# Patient Record
Sex: MALE | Race: BLACK OR AFRICAN AMERICAN | NOT HISPANIC OR LATINO
[De-identification: names, ages, dates, MRNs, and addresses within clinical notes are randomized per-mention and may not be internally consistent; named-entity substitution may affect disease eponyms.]

---

## 2019-06-17 ENCOUNTER — TRANSCRIPTION ENCOUNTER (OUTPATIENT)
Age: 29
End: 2019-06-17

## 2019-06-18 ENCOUNTER — INPATIENT (INPATIENT)
Facility: HOSPITAL | Age: 29
LOS: 1 days | Discharge: HOME | End: 2019-06-20
Attending: PLASTIC SURGERY | Admitting: PLASTIC SURGERY
Payer: MEDICAID

## 2019-06-18 ENCOUNTER — TRANSCRIPTION ENCOUNTER (OUTPATIENT)
Age: 29
End: 2019-06-18

## 2019-06-18 VITALS
RESPIRATION RATE: 20 BRPM | TEMPERATURE: 98 F | OXYGEN SATURATION: 100 % | HEART RATE: 82 BPM | SYSTOLIC BLOOD PRESSURE: 131 MMHG | DIASTOLIC BLOOD PRESSURE: 52 MMHG
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
## 2019-06-18 PROCEDURE — 73630 X-RAY EXAM OF FOOT: CPT | Mod: 26,LT

## 2019-06-18 PROCEDURE — 73130 X-RAY EXAM OF HAND: CPT | Mod: 26,LT

## 2019-06-18 RX ORDER — HUMAN RABIES VIRUS IMMUNE GLOBULIN 150 [IU]/ML
1452 INJECTION, SOLUTION INTRAMUSCULAR ONCE
Refills: 0 | Status: COMPLETED | OUTPATIENT
Start: 2019-06-18 | End: 2019-06-18

## 2019-06-18 RX ORDER — AMPICILLIN SODIUM AND SULBACTAM SODIUM 250; 125 MG/ML; MG/ML
3 INJECTION, POWDER, FOR SUSPENSION INTRAMUSCULAR; INTRAVENOUS ONCE
Refills: 0 | Status: DISCONTINUED | OUTPATIENT
Start: 2019-06-18 | End: 2019-06-18

## 2019-06-18 RX ORDER — TETANUS TOXOID, REDUCED DIPHTHERIA TOXOID AND ACELLULAR PERTUSSIS VACCINE, ADSORBED 5; 2.5; 8; 8; 2.5 [IU]/.5ML; [IU]/.5ML; UG/.5ML; UG/.5ML; UG/.5ML
0.5 SUSPENSION INTRAMUSCULAR ONCE
Refills: 0 | Status: COMPLETED | OUTPATIENT
Start: 2019-06-18 | End: 2019-06-18

## 2019-06-18 RX ORDER — RABIES VACC, HUMAN DIPLOID/PF 2.5 UNIT
1 VIAL (EA) INTRAMUSCULAR ONCE
Refills: 0 | Status: COMPLETED | OUTPATIENT
Start: 2019-06-18 | End: 2019-06-18

## 2019-06-18 RX ORDER — AMPICILLIN SODIUM AND SULBACTAM SODIUM 250; 125 MG/ML; MG/ML
3 INJECTION, POWDER, FOR SUSPENSION INTRAMUSCULAR; INTRAVENOUS
Refills: 0 | Status: COMPLETED | OUTPATIENT
Start: 2019-06-19 | End: 2019-06-19

## 2019-06-18 RX ORDER — RABIES VACC, HUMAN DIPLOID/PF 2.5 UNIT
1 VIAL (EA) INTRAMUSCULAR ONCE
Refills: 0 | Status: DISCONTINUED | OUTPATIENT
Start: 2019-06-18 | End: 2019-06-18

## 2019-06-18 RX ADMIN — Medication 1 TABLET(S): at 21:45

## 2019-06-18 RX ADMIN — TETANUS TOXOID, REDUCED DIPHTHERIA TOXOID AND ACELLULAR PERTUSSIS VACCINE, ADSORBED 0.5 MILLILITER(S): 5; 2.5; 8; 8; 2.5 SUSPENSION INTRAMUSCULAR at 21:43

## 2019-06-18 RX ADMIN — HUMAN RABIES VIRUS IMMUNE GLOBULIN 1452 UNIT(S): 150 INJECTION, SOLUTION INTRAMUSCULAR at 23:56

## 2019-06-18 RX ADMIN — Medication 1 MILLILITER(S): at 23:15

## 2019-06-18 NOTE — ED PROVIDER NOTE - CLINICAL SUMMARY MEDICAL DECISION MAKING FREE TEXT BOX
Pt is a 27 y/o M marijuana smoker PMHx hernia repair p/w dog bites 5 hours ago. -- dog bites w/ unknown vaccination status; possible tendon injury of left hand, r/o fracture -- xray left hand, xray left foot, adacel, rabies vaccines, rabies immune globulin

## 2019-06-18 NOTE — ED PROVIDER NOTE - OBJECTIVE STATEMENT
Pt is a 27 y/o M marijuana smoker PMHx hernia repair p/w dog bites 5 hours ago.  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  Pt notes he has inability to flex at IP joint of left hand 1st digit.  Pt notes mild bleeding, which has resolved.  Pt's tetanus shot not up to date.  Pt states he's not sure if dog's vaccines are up to date and states not sure if dog can be observed.  Pt denies any numbness, lightheadedness or any other injuries.

## 2019-06-18 NOTE — ED PROVIDER NOTE - PHYSICAL EXAMINATION
Wounds:  Puncture wound:  Left hand 0.8 cm dorsal aspect of wrist; 0.8 cm thenar eminence; 1st digit:  dorsal aspect 0.5 cm; overlying IP joint 2 cm.  Left foot:  Between 2nd and 3rd digit 4 cm, jagged.  Right thigh:  Medial aspect 3 cm. Wounds:  Puncture wound:  Left hand 0.8 cm dorsal aspect of wrist; 0.8 cm thenar eminence; 1st digit:  dorsal aspect 0.5 cm; overlying palmar aspect IP joint 2 cm.  Left foot:  Between 2nd and 3rd digit 4 cm, jagged.  Right thigh:  abrasion Medial aspect 3 cm.

## 2019-06-18 NOTE — ED PROVIDER NOTE - ATTENDING CONTRIBUTION TO CARE
Gong: I have seen and examined the patient face to face, have reviewed and addended the HPI, PE and a/p as necessary.     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  Pt sustained bites to L hand and L foot and R thigh.  Noted to be unable to bend L thumb at the IP joint.  Unsure if dog was vaccinated.  Denies numbness, lightheadedness, chest pain, or shortness of breath.     GEN - NAD; well appearing; A+O x3; non-toxic appearing   CARD -s1s2, RRR, no M,G,R;   PULM - CTA b/l, symmetric breath sounds;   ABD:  +BS, ND, NT, soft, no guarding, no rebound, no masses;   BACK: no CVA tenderness, Normal  spine;   EXT: symmetric pulses, 2+ dp, capillary refill < 2 seconds, no clubbing, no cyanosis, no edema  SKIN: Wounds:  Puncture wound:  Left hand 0.8 cm dorsal aspect of wrist; 0.8 cm thenar eminence; 1st digit:  dorsal aspect 0.5 cm; overlying palmar aspect IP joint 2 cm.  Left foot:  Between 2nd and 3rd digit 4 cm, jagged.  Right thigh:  abrasion Medial aspect 3 cm.    29 yo M a/w dog bite, unclear of vaccination of dog.  Concern for tendon injury on L hand, will consult hand.  On Foot noted to have laceration, will consult podiatry.  Local wound care to thigh.  Tetanus up to date. Rabies vaccine and Rabies immunoglobulin to wound  xrays, augmentin, may require admission for surgical repair of hand.

## 2019-06-18 NOTE — ED PROVIDER NOTE - PROGRESS NOTE DETAILS
STANLEY BOB:  Pt seen by Dr. Hanna who recommends admission at this time.  Podiatry to see patient.  Dr. Hanna recommends Unasyn and NPO after midnight. STANLEY BOB:  Pt seen by Dr. Hanna who recommends admission at this time.  Podiatry to see patient.  Dr. Hanna recommends Unasyn and NPO after midnight.  Rabies immune globulin injected into bite sites.

## 2019-06-18 NOTE — ED PROVIDER NOTE - UPPER EXTREMITY EXAM, LEFT
unable to flex at interphalyngeal joint of 1st digit; weakness with adduction and abduction, < 2 sec capillary refill; sensation intact to light touch

## 2019-06-18 NOTE — ED ADULT TRIAGE NOTE - CHIEF COMPLAINT QUOTE
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

## 2019-06-19 ENCOUNTER — TRANSCRIPTION ENCOUNTER (OUTPATIENT)
Age: 29
End: 2019-06-19

## 2019-06-19 VITALS
OXYGEN SATURATION: 100 % | TEMPERATURE: 98 F | RESPIRATION RATE: 17 BRPM | DIASTOLIC BLOOD PRESSURE: 81 MMHG | HEART RATE: 100 BPM | SYSTOLIC BLOOD PRESSURE: 144 MMHG
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
## 2019-06-19 LAB
ALBUMIN SERPL ELPH-MCNC: 5 G/DL — SIGNIFICANT CHANGE UP (ref 3.3–5)
ALP SERPL-CCNC: 38 U/L — LOW (ref 40–120)
ALT FLD-CCNC: 19 U/L — SIGNIFICANT CHANGE UP (ref 4–41)
ANION GAP SERPL CALC-SCNC: 18 MMO/L — HIGH (ref 7–14)
APTT BLD: 27.9 SEC — SIGNIFICANT CHANGE UP (ref 27.5–36.3)
AST SERPL-CCNC: 33 U/L — SIGNIFICANT CHANGE UP (ref 4–40)
BASOPHILS # BLD AUTO: 0.03 K/UL — SIGNIFICANT CHANGE UP (ref 0–0.2)
BASOPHILS NFR BLD AUTO: 0.2 % — SIGNIFICANT CHANGE UP (ref 0–2)
BILIRUB SERPL-MCNC: 0.8 MG/DL — SIGNIFICANT CHANGE UP (ref 0.2–1.2)
BLD GP AB SCN SERPL QL: NEGATIVE — SIGNIFICANT CHANGE UP
BUN SERPL-MCNC: 14 MG/DL — SIGNIFICANT CHANGE UP (ref 7–23)
CALCIUM SERPL-MCNC: 9.9 MG/DL — SIGNIFICANT CHANGE UP (ref 8.4–10.5)
CHLORIDE SERPL-SCNC: 102 MMOL/L — SIGNIFICANT CHANGE UP (ref 98–107)
CO2 SERPL-SCNC: 22 MMOL/L — SIGNIFICANT CHANGE UP (ref 22–31)
CREAT SERPL-MCNC: 1.53 MG/DL — HIGH (ref 0.5–1.3)
EOSINOPHIL # BLD AUTO: 0 K/UL — SIGNIFICANT CHANGE UP (ref 0–0.5)
EOSINOPHIL NFR BLD AUTO: 0 % — SIGNIFICANT CHANGE UP (ref 0–6)
GLUCOSE SERPL-MCNC: 94 MG/DL — SIGNIFICANT CHANGE UP (ref 70–99)
HCT VFR BLD CALC: 45.6 % — SIGNIFICANT CHANGE UP (ref 39–50)
HGB BLD-MCNC: 15.7 G/DL — SIGNIFICANT CHANGE UP (ref 13–17)
IMM GRANULOCYTES NFR BLD AUTO: 0.5 % — SIGNIFICANT CHANGE UP (ref 0–1.5)
INR BLD: 1.05 — SIGNIFICANT CHANGE UP (ref 0.88–1.17)
LYMPHOCYTES # BLD AUTO: 0.36 K/UL — LOW (ref 1–3.3)
LYMPHOCYTES # BLD AUTO: 2.2 % — LOW (ref 13–44)
MCHC RBC-ENTMCNC: 29.5 PG — SIGNIFICANT CHANGE UP (ref 27–34)
MCHC RBC-ENTMCNC: 34.4 % — SIGNIFICANT CHANGE UP (ref 32–36)
MCV RBC AUTO: 85.6 FL — SIGNIFICANT CHANGE UP (ref 80–100)
MONOCYTES # BLD AUTO: 0.91 K/UL — HIGH (ref 0–0.9)
MONOCYTES NFR BLD AUTO: 5.5 % — SIGNIFICANT CHANGE UP (ref 2–14)
NEUTROPHILS # BLD AUTO: 15.17 K/UL — HIGH (ref 1.8–7.4)
NEUTROPHILS NFR BLD AUTO: 91.6 % — HIGH (ref 43–77)
NRBC # FLD: 0 K/UL — SIGNIFICANT CHANGE UP (ref 0–0)
PLATELET # BLD AUTO: 196 K/UL — SIGNIFICANT CHANGE UP (ref 150–400)
PMV BLD: 9.4 FL — SIGNIFICANT CHANGE UP (ref 7–13)
POTASSIUM SERPL-MCNC: 3.2 MMOL/L — LOW (ref 3.5–5.3)
POTASSIUM SERPL-SCNC: 3.2 MMOL/L — LOW (ref 3.5–5.3)
PROT SERPL-MCNC: 7.7 G/DL — SIGNIFICANT CHANGE UP (ref 6–8.3)
PROTHROM AB SERPL-ACNC: 12 SEC — SIGNIFICANT CHANGE UP (ref 9.8–13.1)
RBC # BLD: 5.33 M/UL — SIGNIFICANT CHANGE UP (ref 4.2–5.8)
RBC # FLD: 11.9 % — SIGNIFICANT CHANGE UP (ref 10.3–14.5)
RH IG SCN BLD-IMP: POSITIVE — SIGNIFICANT CHANGE UP
SODIUM SERPL-SCNC: 142 MMOL/L — SIGNIFICANT CHANGE UP (ref 135–145)
WBC # BLD: 16.55 K/UL — HIGH (ref 3.8–10.5)
WBC # FLD AUTO: 16.55 K/UL — HIGH (ref 3.8–10.5)

## 2019-06-19 PROCEDURE — 99254 IP/OBS CNSLTJ NEW/EST MOD 60: CPT

## 2019-06-19 PROCEDURE — 99223 1ST HOSP IP/OBS HIGH 75: CPT

## 2019-06-19 PROCEDURE — 93010 ELECTROCARDIOGRAM REPORT: CPT

## 2019-06-19 RX ORDER — AMPICILLIN SODIUM AND SULBACTAM SODIUM 250; 125 MG/ML; MG/ML
INJECTION, POWDER, FOR SUSPENSION INTRAMUSCULAR; INTRAVENOUS
Refills: 0 | Status: DISCONTINUED | OUTPATIENT
Start: 2019-06-19 | End: 2019-06-20

## 2019-06-19 RX ORDER — POVIDONE-IODINE 5 %
1 AEROSOL (ML) TOPICAL ONCE
Refills: 0 | Status: COMPLETED | OUTPATIENT
Start: 2019-06-19 | End: 2019-06-19

## 2019-06-19 RX ORDER — AMPICILLIN SODIUM AND SULBACTAM SODIUM 250; 125 MG/ML; MG/ML
3 INJECTION, POWDER, FOR SUSPENSION INTRAMUSCULAR; INTRAVENOUS ONCE
Refills: 0 | Status: COMPLETED | OUTPATIENT
Start: 2019-06-19 | End: 2019-06-19

## 2019-06-19 RX ORDER — OXYCODONE HYDROCHLORIDE 5 MG/1
5 TABLET ORAL EVERY 4 HOURS
Refills: 0 | Status: DISCONTINUED | OUTPATIENT
Start: 2019-06-19 | End: 2019-06-20

## 2019-06-19 RX ORDER — SODIUM CHLORIDE 9 MG/ML
1000 INJECTION, SOLUTION INTRAVENOUS
Refills: 0 | Status: DISCONTINUED | OUTPATIENT
Start: 2019-06-19 | End: 2019-06-19

## 2019-06-19 RX ORDER — SODIUM CHLORIDE 9 MG/ML
1000 INJECTION, SOLUTION INTRAVENOUS
Refills: 0 | Status: DISCONTINUED | OUTPATIENT
Start: 2019-06-19 | End: 2019-06-20

## 2019-06-19 RX ORDER — AMPICILLIN SODIUM AND SULBACTAM SODIUM 250; 125 MG/ML; MG/ML
3 INJECTION, POWDER, FOR SUSPENSION INTRAMUSCULAR; INTRAVENOUS EVERY 6 HOURS
Refills: 0 | Status: DISCONTINUED | OUTPATIENT
Start: 2019-06-20 | End: 2019-06-20

## 2019-06-19 RX ORDER — ACETAMINOPHEN 500 MG
650 TABLET ORAL EVERY 6 HOURS
Refills: 0 | Status: DISCONTINUED | OUTPATIENT
Start: 2019-06-19 | End: 2019-06-20

## 2019-06-19 RX ORDER — CHLORHEXIDINE GLUCONATE 213 G/1000ML
1 SOLUTION TOPICAL EVERY 12 HOURS
Refills: 0 | Status: DISCONTINUED | OUTPATIENT
Start: 2019-06-19 | End: 2019-06-20

## 2019-06-19 RX ORDER — OXYCODONE HYDROCHLORIDE 5 MG/1
10 TABLET ORAL EVERY 4 HOURS
Refills: 0 | Status: DISCONTINUED | OUTPATIENT
Start: 2019-06-19 | End: 2019-06-20

## 2019-06-19 RX ORDER — ONDANSETRON 8 MG/1
4 TABLET, FILM COATED ORAL EVERY 8 HOURS
Refills: 0 | Status: DISCONTINUED | OUTPATIENT
Start: 2019-06-19 | End: 2019-06-20

## 2019-06-19 RX ORDER — IBUPROFEN 200 MG
1 TABLET ORAL
Qty: 30 | Refills: 0
Start: 2019-06-19

## 2019-06-19 RX ADMIN — CHLORHEXIDINE GLUCONATE 1 APPLICATION(S): 213 SOLUTION TOPICAL at 10:00

## 2019-06-19 RX ADMIN — ONDANSETRON 4 MILLIGRAM(S): 8 TABLET, FILM COATED ORAL at 04:16

## 2019-06-19 RX ADMIN — Medication 1 APPLICATION(S): at 19:58

## 2019-06-19 RX ADMIN — AMPICILLIN SODIUM AND SULBACTAM SODIUM 200 GRAM(S): 250; 125 INJECTION, POWDER, FOR SUSPENSION INTRAMUSCULAR; INTRAVENOUS at 19:01

## 2019-06-19 RX ADMIN — SODIUM CHLORIDE 75 MILLILITER(S): 9 INJECTION, SOLUTION INTRAVENOUS at 23:25

## 2019-06-19 RX ADMIN — SODIUM CHLORIDE 125 MILLILITER(S): 9 INJECTION, SOLUTION INTRAVENOUS at 03:52

## 2019-06-19 RX ADMIN — AMPICILLIN SODIUM AND SULBACTAM SODIUM 200 GRAM(S): 250; 125 INJECTION, POWDER, FOR SUSPENSION INTRAMUSCULAR; INTRAVENOUS at 06:56

## 2019-06-19 NOTE — DISCHARGE NOTE PROVIDER - CARE PROVIDER_API CALL
Anup Hanna (DO)  Plastic Surgery  [address] [place] [address]  Phone: [phone]  Fax: [phone]  Follow Up Time:

## 2019-06-19 NOTE — DISCHARGE NOTE NURSING/CASE MANAGEMENT/SOCIAL WORK - NSDCFUADDAPPT_GEN_ALL_CORE_FT
Please follow-up with Dr. Hanna on Friday. Call on Thursday for an appointment. You will have to continue Rabies vaccines as well.

## 2019-06-19 NOTE — H&P ADULT - NSHPLABSRESULTS_GEN_ALL_CORE
15.7   16.55 )-----------( 196      ( 19 Jun 2019 00:40 )             45.6     19 Jun 2019 00:40    142    |  102    |  14     ----------------------------<  94     3.2     |  22     |  1.53     Ca    9.9        19 Jun 2019 00:40    TPro  7.7    /  Alb  5.0    /  TBili  0.8    /  DBili  x      /  AST  33     /  ALT  19     /  AlkPhos  38     19 Jun 2019 00:40    LIVER FUNCTIONS - ( 19 Jun 2019 00:40 )  Alb: 5.0 g/dL / Pro: 7.7 g/dL / ALK PHOS: 38 u/L / ALT: 19 u/L / AST: 33 u/L / GGT: x           PT/INR - ( 19 Jun 2019 00:40 )   PT: 12.0 SEC;   INR: 1.05          PTT - ( 19 Jun 2019 00:40 )  PTT:27.9 SEC  CAPILLARY BLOOD GLUCOSE            < from: Xray Hand 3 Views, Left (06.18.19 @ 23:14) >    EXAM:  RAD HAND 3 VIEW LT        PROCEDURE DATE:  Jun 18 2019         INTERPRETATION:  CLINICAL INFORMATION: Dog bite. Difficulty abducting and   abducting first digit.    TECHNIQUE: 3 views of the left hand.    COMPARISON: None available.    IMPRESSION:     No acute fracture or dislocation. No radiopaque foreign body. Soft tissue   swelling is noted around the first IP joint. Joint spaces are preserved.      < end of copied text >

## 2019-06-19 NOTE — DISCHARGE NOTE PROVIDER - NSDCFUADDAPPT_GEN_ALL_CORE_FT
Please follow-up with Dr. Hanna in one week. Please follow-up with Dr. Hanna on Friday. Call on Thursday for an appointment. You will have to continue Rabies vaccines as well.

## 2019-06-19 NOTE — CONSULT NOTE ADULT - SUBJECTIVE AND OBJECTIVE BOX
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  Bitten on left hand, left foot, right thigh.  In ER received rabies immune globulin injected in wounds and rabies vaccine x 1 and Adacel x 1.  Awaiting OR.     PAST MEDICAL & SURGICAL HISTORY:  No pertinent past medical history  Hernia repair as child    Allergies    No Known Allergies    Intolerances        ANTIMICROBIALS:  amoxicillin  875 milliGRAM(s)/clavulanate 1 two times a day      OTHER MEDS:  acetaminophen   Tablet .. 650 milliGRAM(s) Oral every 6 hours PRN  chlorhexidine 2% Cloths 1 Application(s) Topical every 12 hours  lactated ringers. 1000 milliLiter(s) IV Continuous <Continuous>  ondansetron Injectable 4 milliGRAM(s) IV Push every 8 hours PRN  oxyCODONE    IR 5 milliGRAM(s) Oral every 4 hours PRN  oxyCODONE    IR 10 milliGRAM(s) Oral every 4 hours PRN  povidone iodine 5% Nasal Swab 1 Application(s) Both Nostrils once      SOCIAL HISTORY: lives home    FAMILY HISTORY: non contrib      REVIEW OF SYSTEMS  [  ] ROS unobtainable because:    [ x ] All other systems negative except as noted below:	    Constitutional:  [ ] fever [ ] chills  [ ] weight loss  [ ] weakness  Skin:  [ ] rash [ ] phlebitis	  Eyes: [ ] icterus [ ] pain  [ ] discharge	  ENMT: [ ] sore throat  [ ] thrush [ ] ulcers [ ] exudates  Respiratory: [ ] dyspnea [ ] hemoptysis [ ] cough [ ] sputum	  Cardiovascular:  [ ] chest pain [ ] palpitations [ ] edema	  Gastrointestinal:  [ ] nausea [ ] vomiting [ ] diarrhea [ ] constipation [ ] pain	  Genitourinary:  [ ] dysuria [ ] frequency [ ] hematuria [ ] discharge [ ] flank pain  [ ] incontinence  Musculoskeletal:  [ ] myalgias [ ] arthralgias [ ] arthritis  [ ] back pain pain hand, foot, thigh  Neurological:  [ ] headache [ ] seizures  [ ] confusion/altered mental status  Psychiatric:  [ ] anxiety [ ] depression	  Hematology/Lymphatics:  [ ] lymphadenopathy  Endocrine:  [ ] adrenal [ ] thyroid  Allergic/Immunologic:	 [ ] transplant [ ] seasonal    PHYSICAL EXAM:  General:  non-toxic  HEAD/EYES:  white sclera  ENT:   supple  Cardiovascular:  S1S2  Respiratory:   clear to ausculation bilaterally  GI:   soft, non-tender, normal bowel sounds  :  no morton   Musculoskeletal: dressing left hand, left foot, right thigh   Skin:   no rash  Psychiatric:   appropriate affect  alert & oriented  Lines:  peripheral iv right arm          Drug Dosing Weight  Height (cm): 175.26 (19 Jun 2019 07:54)  Weight (kg): 67.6 (19 Jun 2019 07:54)  BMI (kg/m2): 22 (19 Jun 2019 07:54)  BSA (m2): 1.82 (19 Jun 2019 07:54)    Vital Signs Last 24 Hrs  T(F): 98 (06-19-19 @ 09:20), Max: 98.8 (06-19-19 @ 01:00)    Vital Signs Last 24 Hrs  HR: 62 (06-19-19 @ 09:20) (60 - 82)  BP: 132/71 (06-19-19 @ 09:20) (128/57 - 132/71)  RR: 18 (06-19-19 @ 09:20)  SpO2: 100% (06-19-19 @ 09:20) (100% - 100%)  Wt(kg): --                          15.7   16.55 )-----------( 196      ( 19 Jun 2019 00:40 )             45.6       06-19    142  |  102  |  14  ----------------------------<  94  3.2<L>   |  22  |  1.53<H>    Ca    9.9      19 Jun 2019 00:40    TPro  7.7  /  Alb  5.0  /  TBili  0.8  /  DBili  x   /  AST  33  /  ALT  19  /  AlkPhos  38<L>  06-19          MICROBIOLOGY:        RADIOLOGY:    < from: Xray Hand 3 Views, Left (06.18.19 @ 23:14) >  No acute fracture or dislocation. No radiopaque foreign body. Soft tissue   swelling is noted around the first IP joint. Joint spaces are preserved.      < end of copied text >  < from: Xray Foot AP + Lateral + Oblique, Left (06.18.19 @ 23:12) >  There is no acute fracture or dislocation. The soft tissue laceration is   not identified radiographically. No radiopaque foreign body. No   subcutaneous emphysema. Joint spaces are preserved.     < end of copied text >
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  Dog bites on L foot, right thigh and L hand - L hand is the most extensive.  Pt not aware of vaccination status of dog.  Pt denies medical history, does not take routine medications daily.  he does occasionally smoke marijuana.  He is able to perform all of his own ADLs.  Denies fevers/chills/n/v.  Pain in L hand - decreased ROM of fingers.  he has no loss of sensation in hand.        PAST MEDICAL & SURGICAL HISTORY:  No pertinent past medical history      Review of Systems:   CONSTITUTIONAL: No fever, weight loss, or fatigue  EYES: No eye pain, visual disturbances, or discharge  ENMT:  No difficulty hearing, tinnitus, vertigo; No sinus or throat pain  NECK: No pain or stiffness  BREASTS: No pain, masses, or nipple discharge  RESPIRATORY: No cough, wheezing, chills or hemoptysis; No shortness of breath  CARDIOVASCULAR: No chest pain, palpitations, dizziness, or leg swelling  GASTROINTESTINAL: No abdominal or epigastric pain. No nausea, vomiting, or hematemesis; No diarrhea or constipation. No melena or hematochezia.  GENITOURINARY: No dysuria, frequency, hematuria, or incontinence  NEUROLOGICAL: No headaches, memory loss, loss of strength, numbness, or tremors  SKIN: dog bites on foot, thigh and hand   LYMPH NODES: No enlarged glands  ENDOCRINE: No heat or cold intolerance; No hair loss  MUSCULOSKELETAL: No joint pain or swelling; No muscle, back, or extremity pain  PSYCHIATRIC: No depression, anxiety, mood swings, or difficulty sleeping  HEME/LYMPH: No easy bruising, or bleeding gums  ALLERY AND IMMUNOLOGIC: No hives or eczema    Allergies  No Known Allergies    Social History:   single, works as [employer]  and [employer], smokes marijuana occasionally    FAMILY HISTORY:  Noncontributory    MEDICATIONS  (STANDING):  amoxicillin  875 milliGRAM(s)/clavulanate 1 Tablet(s) Oral two times a day  chlorhexidine 2% Cloths 1 Application(s) Topical every 12 hours  lactated ringers. 1000 milliLiter(s) (125 mL/Hr) IV Continuous <Continuous>  povidone iodine 5% Nasal Swab 1 Application(s) Both Nostrils once    MEDICATIONS  (PRN):  acetaminophen   Tablet .. 650 milliGRAM(s) Oral every 6 hours PRN Temp greater or equal to 38C (100.4F), Mild Pain (1 - 3)  ondansetron Injectable 4 milliGRAM(s) IV Push every 8 hours PRN Nausea and/or Vomiting  oxyCODONE    IR 5 milliGRAM(s) Oral every 4 hours PRN Moderate Pain (4 - 6)  oxyCODONE    IR 10 milliGRAM(s) Oral every 4 hours PRN Severe Pain (7 - 10)      ICU Vital Signs Last 24 Hrs  T(C): 36.4 (19 Jun 2019 14:17), Max: 37.1 (19 Jun 2019 01:00)  T(F): 97.6 (19 Jun 2019 14:17), Max: 98.8 (19 Jun 2019 01:00)  HR: 52 (19 Jun 2019 14:17) (52 - 82)  BP: 126/73 (19 Jun 2019 14:17) (126/73 - 132/71)  BP(mean): --  ABP: --  ABP(mean): --  RR: 18 (19 Jun 2019 14:17) (16 - 20)  SpO2: 100% (19 Jun 2019 14:17) (100% - 100%)      PHYSICAL EXAM:  GENERAL: NAD, well-developed  HEAD:  Atraumatic, Normocephalic  EYES: EOMI, conjunctiva and sclera clear  NECK: Supple, No JVD  CHEST/LUNG: Clear to auscultation bilaterally; No wheeze  HEART: Regular rate and rhythm; No murmurs  ABDOMEN: Soft, Nontender, Nondistended; Bowel sounds present  EXTREMITIES:  2+ Peripheral Pulses; no edema; L foot bandaged, right thigh wound dressing c/d/i; L hand wrapped   PSYCH: AAOx3  NEUROLOGY: non-focal  SKIN: No rashes or lesions    LABS:                        15.7   16.55 )-----------( 196      ( 19 Jun 2019 00:40 )             45.6     06-19    142  |  102  |  14  ----------------------------<  94  3.2<L>   |  22  |  1.53<H>    Ca    9.9      19 Jun 2019 00:40    TPro  7.7  /  Alb  5.0  /  TBili  0.8  /  DBili  x   /  AST  33  /  ALT  19  /  AlkPhos  38<L>  06-19    PT/INR - ( 19 Jun 2019 00:40 )   PT: 12.0 SEC;   INR: 1.05          PTT - ( 19 Jun 2019 00:40 )  PTT:27.9 SEC
Patient is a 28y old  Male who presents with a chief complaint of     HPI: Pt is a 29 y/o M marijuana smoker PMHx hernia repair p/w dog bites 5 hours ago.  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  Pt notes he has inability to flex at IP joint of left hand 1st digit.  Pt notes mild bleeding, which has resolved.  Pt's tetanus shot not up to date.  Pt states he's not sure if dog's vaccines are up to date and states not sure if dog can be observed.  Pt denies any numbness, lightheadedness or any other injuries.      PAST MEDICAL & SURGICAL HISTORY:  No pertinent past medical history      MEDICATIONS  (STANDING):  ampicillin/sulbactam  IVPB 3 Gram(s) IV Intermittent <User Schedule>    MEDICATIONS  (PRN):      Allergies    No Known Allergies    Intolerances        VITALS:    Vital Signs Last 24 Hrs  T(C): 36.9 (18 Jun 2019 19:42), Max: 36.9 (18 Jun 2019 19:42)  T(F): 98.5 (18 Jun 2019 19:42), Max: 98.5 (18 Jun 2019 19:42)  HR: 82 (18 Jun 2019 19:42) (82 - 82)  BP: 131/52 (18 Jun 2019 19:42) (131/52 - 131/52)  BP(mean): --  RR: 20 (18 Jun 2019 19:42) (20 - 20)  SpO2: 100% (18 Jun 2019 19:42) (100% - 100%)    LABS:                          15.7   16.55 )-----------( 196      ( 19 Jun 2019 00:40 )             45.6               CAPILLARY BLOOD GLUCOSE          PT/INR - ( 19 Jun 2019 00:40 )   PT: 12.0 SEC;   INR: 1.05          PTT - ( 19 Jun 2019 00:40 )  PTT:27.9 SEC    LOWER EXTREMITY PHYSICAL EXAM:    Vasular: DP/PT 2/4, B/L, CFT <3 seconds B/L, Temperature gradient warm to warm, B/L.   Neuro: Epicritic sensation intact to the level of digits, B/L.    NEUROVASCULAR STATUS OF THE 2ND AND 3RD DIGIT INTACT; NO COMPROMISE     Musculoskeletal/Ortho: No pain on palpation at any pedal site. No pain with flexion or extension of the 2nd digit or 3rd digit   Skin:  Smooth laceration extending from the 2nd met head to the lateral aspect of the 2nd digit; not jagged or tearing noted to lac site. Fat layer exposed BUT no tendon exposure or bone exposure. No residual debris noted to wound.     POST LAVAGE --> no residual debris, clean wound with no signs of foreign bodies.     RADIOLOGY & ADDITIONAL STUDIES:    < from: Xray Foot AP + Lateral + Oblique, Left (06.18.19 @ 23:12) >    ******PRELIMINARY REPORT******    ******PRELIMINARY REPORT******            EXAM:  RAD FOOT MIN 3 VIEWS LT        PROCEDURE DATE:  Jun 18 2019     ******PRELIMINARY REPORT******    ******PRELIMINARY REPORT******            INTERPRETATION:  There isno acute fracture or dislocation. The soft   tissue laceration is not identified radiographically. No radiopaque   foreign body.            ******PRELIMINARY REPORT******    ******PRELIMINARY REPORT******          DOMENICA LOCKE M.D., RADIOLOGY RESIDENT                        < end of copied text >

## 2019-06-19 NOTE — ED ADULT NURSE NOTE - OBJECTIVE STATEMENT
A&Ox3 sustained dog bites to L foot, L hand, R thigh. No PMH, plastics cleaned & wrapped L hand, podiatry saw patient for L foot. Patient NPO. No acute distress vitals stable. IV intact. Report given to floor, will continue to monitor

## 2019-06-19 NOTE — BRIEF OPERATIVE NOTE - NSICDXBRIEFPREOP_GEN_ALL_CORE_FT
PRE-OP DIAGNOSIS:  Flexor tendon laceration, finger, open wound 19-Jun-2019 22:40:09  Anup Hanna  Dog bite of left hand 19-Jun-2019 22:39:46  Anup Hanna

## 2019-06-19 NOTE — H&P ADULT - HISTORY OF PRESENT ILLNESS
28M social: marijuana smoker PMHx hernia repair presents with dog bites to Left hand, Right thigh, Left foot. 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  Pt notes he has inability to flex at IP joint of left hand 1st digit.  Pt notes mild bleeding, which has resolved.  Pt's tetanus shot not up to date.  Pt states he's not sure if dog's vaccines are up to date and states not sure if dog can be observed.  Pt denies any numbness, lightheadedness or any other injuries.

## 2019-06-19 NOTE — CONSULT NOTE ADULT - PROBLEM SELECTOR RECOMMENDATION 9
- wound care and washout as per plastics  - possible tendon injury given pt with decreased ROM of thumb  - would change from augmentin to unasyn while inpt.    - RCRI has pt as a very low risk candidate (0.4%) for washout of L hand wounds  - EKG pending

## 2019-06-19 NOTE — DISCHARGE NOTE NURSING/CASE MANAGEMENT/SOCIAL WORK - NSDCPNINST_GEN_ALL_CORE
Patient alert and oriented x4, breathing unlaboured on room air. Denies pain. Left arm with ace bandage, dressing dry and intact. Pt instructed to call 911 if he has any signs of fever or purulent drainage from wound, shortness of breath or feels unwell. Pt instructed to follow up with md on friday. Patient alert and oriented x4, breathing unlaboured on room air. Denies pain. Left arm with ace bandage, dressing dry and intact. Pt instructed to call 911 if he has any signs of fever or purulent drainage from wound, shortness of breath or feels unwell, also to  keep the left  arm elevated. Pt instructed to follow up with md on friday.

## 2019-06-19 NOTE — H&P ADULT - ASSESSMENT
Notified.   28M with dog bites to Left hand, Right thigh, Left foot  -Foot per podiatry  -thigh local wound care  -hand for OR today  -NPO  -Abx  -Rabies vaccine

## 2019-06-19 NOTE — DISCHARGE NOTE PROVIDER - HOSPITAL COURSE
Patient was admitted for dog bite of hand and foot. He was treated by podiatry to foot bit. He was brought to OR for washout and exploration of hand bite. The patient tolerated the procedure well. There were no complications. The patient was extubated in the OR and transferred to the PACU in stable condition. The patient was brought to the  floor in stable condition. Diet was advanced as tolerated to a regular diet and pain medication was transitioned to PO. On day of discharge the patient's vitals signs were stable, was tolerating a regular diet, pain was well controlled with PO pain medications and the patient was ambulating.  Patient was discharged with prescriptions for pain medication and instructions for followup. Patient was admitted for dog bite of hand and foot. He was treated by podiatry to foot bite. He was brought to OR for washout and exploration of hand bite. The patient tolerated the procedure well. There were no complications. The patient was extubated in the OR and transferred to the PACU in stable condition. The patient was brought to the  floor in stable condition. Diet was advanced as tolerated to a regular diet and pain medication was transitioned to PO. On day of discharge the patient's vitals signs were stable, was tolerating a regular diet, pain was well controlled with PO pain medications and the patient was ambulating.  Patient was discharged with prescriptions for antibiotics and instructions for followup.

## 2019-06-19 NOTE — H&P ADULT - NSHPPHYSICALEXAM_GEN_ALL_CORE
NAD AOx3  Left hand wrapped in splint  abrasions to Right medial thigh  Foot wrapped after podiatry repair

## 2019-06-19 NOTE — CONSULT NOTE ADULT - ASSESSMENT
27 yo man with dog bite with lacerations left hand, left foot, right thigh.  Patient had local wound cleansing, irrigation in ER. Awaiting OR today. Await cultures.  Post exposure rabies immunization initiated yesterday with rabies immune globulin infiltrated around wounds and rabies vaccine given day 0.  Adacel given for anti- tetanus prophylaxis.  on augmentin for wounds.    Suggest:   would transition augmentin to unasyn while hospitalized - unasyn 3 gm iv q 6 h  continue rabies post exposure immunizations with rabies vaccine IM deltoid days 3, 7, 14.    will follow.    Madalyn Dias MD  Pager: [phone]  After 5 PM or weekends please call fellow on call or office [phone]

## 2019-06-19 NOTE — DISCHARGE NOTE NURSING/CASE MANAGEMENT/SOCIAL WORK - NSDCDPATPORTLINK_GEN_ALL_CORE
You can access the [facility] Portal, offered by [facility], by registering with the following website: [email or web address]

## 2019-06-19 NOTE — CONSULT NOTE ADULT - ASSESSMENT
28 year old male LEFT foot dog bite, clean   - Pt was examined and evaluated  - Presentation of wound is free of any debris, linear lac with no tearing or jagged stephany of laceration. No retained FB.   - Presentation and closure of wound <6 hours. Foot was placed in dilute betadine saline solution for 30 mins; 2nd digit was then anesthesized using 7cc of 1% lidocaine plain. Once adequate anesthesia was obtained, foot was scrubbed, prepped and draped in usual sterile fashion. Using sterile technique, wound was explored with sterile instruments; no retained foreign body or debris noted to wound. Wound down to fat layer, but not including fat layer. No tendon or bone involvement. Pt able to flex and extend digits with no pain on passive or active ROM. Wound flushed with 300cc of dilute betadine saline solution. Laceration was then repaired with 3-0 and 4-0 nylon. Wound dressed with bacitracin and DSD.   - Rec tetanus UTD, as well as Rabies prophylaxis   - Rec begin on Augmentin 875mg BID for 10 days following dog bite protocol.   - Keep dressing CDI till f/u   - Pt to follow-up with Dr. Hanson within 1 week; call [phone] for appointment. If unable to obtain appointment 2/2 insurance, pt can f/u at [facility] within 1 week. Call [phone] for appointment.   - D/w attending. 28 year old male LEFT foot dog bite, clean   - Pt was examined and evaluated  - Presentation of wound is free of any debris, linear lac with no tearing or jagged stephany of laceration. No retained FB.   - Presentation and closure of wound <6 hours. Foot was placed in dilute betadine saline solution for 30 mins; 2nd digit was then anesthesized using 7cc of 1% lidocaine plain. Once adequate anesthesia was obtained, foot was scrubbed, prepped and draped in usual sterile fashion. Using sterile technique, wound was explored with sterile instruments; no retained foreign body or debris noted to wound. Wound down to fat layer, but not including fat layer. No tendon or bone involvement. Pt able to flex and extend digits with no pain on passive or active ROM. Wound flushed with 300cc of dilute betadine saline solution. Laceration was then repaired with 3-0 and 4-0 nylon. Wound dressed with bacitracin and DSD.   - Rec tetanus UTD, as well as Rabies prophylaxis   - Rec begin on Augmentin 875mg BID for 10 days following dog bite protocol.   - Keep dressing CDI till f/u   - Rec protected WBAT in surgical shoe with crutch assistance at all times. OR NWB to LLE if unable due to hand injury.   - Pt to follow-up with Dr. Hanson within 1 week; call [phone] for appointment. If unable to obtain appointment 2/2 insurance, pt can f/u at [facility] within 1 week. Call [phone] for appointment.   - D/w attending.

## 2019-06-19 NOTE — DISCHARGE NOTE PROVIDER - NSDCFUADDINST_GEN_ALL_CORE_FT
Continue dressing.   Keep dressing dry.  Elevate extremity  Take antibiotics as prescribed. Continue dressing.   Keep dressing dry.  Elevate extremity above heart level  Take antibiotics as prescribed.

## 2019-06-20 RX ORDER — IBUPROFEN 200 MG
400 TABLET ORAL ONCE
Refills: 0 | Status: COMPLETED | OUTPATIENT
Start: 2019-06-20 | End: 2019-06-20

## 2019-06-20 RX ADMIN — Medication 400 MILLIGRAM(S): at 00:37

## 2019-06-21 ENCOUNTER — EMERGENCY (EMERGENCY)
Facility: HOSPITAL | Age: 29
LOS: 1 days | Discharge: HOME | End: 2019-06-21
Admitting: EMERGENCY MEDICINE
Payer: MEDICAID

## 2019-06-21 VITALS
SYSTOLIC BLOOD PRESSURE: 144 MMHG | TEMPERATURE: 98 F | DIASTOLIC BLOOD PRESSURE: 98 MMHG | HEART RATE: 55 BPM | OXYGEN SATURATION: 100 % | RESPIRATION RATE: 18 BRPM

## 2019-06-21 PROBLEM — Z78.9 OTHER SPECIFIED HEALTH STATUS: Chronic | Status: ACTIVE | Noted: 2019-06-18

## 2019-06-21 LAB
APPEARANCE UR: CLEAR — SIGNIFICANT CHANGE UP
BILIRUB UR-MCNC: NEGATIVE — SIGNIFICANT CHANGE UP
BLOOD UR QL VISUAL: NEGATIVE — SIGNIFICANT CHANGE UP
COLOR SPEC: SIGNIFICANT CHANGE UP
GLUCOSE UR-MCNC: NEGATIVE — SIGNIFICANT CHANGE UP
KETONES UR-MCNC: NEGATIVE — SIGNIFICANT CHANGE UP
LEUKOCYTE ESTERASE UR-ACNC: NEGATIVE — SIGNIFICANT CHANGE UP
NITRITE UR-MCNC: NEGATIVE — SIGNIFICANT CHANGE UP
PH UR: 6 — SIGNIFICANT CHANGE UP (ref 5–8)
PROT UR-MCNC: 10 — SIGNIFICANT CHANGE UP
SP GR SPEC: 1.01 — SIGNIFICANT CHANGE UP (ref 1–1.04)
UROBILINOGEN FLD QL: NORMAL — SIGNIFICANT CHANGE UP

## 2019-06-21 PROCEDURE — 99283 EMERGENCY DEPT VISIT LOW MDM: CPT

## 2019-06-21 RX ORDER — RABIES VACC, HUMAN DIPLOID/PF 2.5 UNIT
1 VIAL (EA) INTRAMUSCULAR ONCE
Refills: 0 | Status: COMPLETED | OUTPATIENT
Start: 2019-06-21 | End: 2019-06-21

## 2019-06-21 RX ORDER — RABIES VACC, HUMAN DIPLOID/PF 2.5 UNIT
1 VIAL (EA) INTRAMUSCULAR ONCE
Refills: 0 | Status: DISCONTINUED | OUTPATIENT
Start: 2019-06-21 | End: 2019-06-21

## 2019-06-21 RX ADMIN — Medication 1 MILLILITER(S): at 14:47

## 2019-06-21 NOTE — ED PROVIDER NOTE - PROGRESS NOTE DETAILS
STANLEY CHRISTIANSON: Pt doesn't want to wait for UA results, given number to call. Pt's wound dressing in left foot changed, bacitracin applied w/ new dressing. Pt is medically stable for discharge and follow up with PMD, Hand, Podiatry. Instructions given to return to ED for additional rabies vaccine. The patient was given verbal and written discharge instructions. Specifically, instructions when to return to the ED and when to seek follow-up from their pcp was discussed. Any specialty follow-up was discussed, including how to make an appointment.  Instructions were discussed in simple, plain language and was understood by the patient. The patient understands that should their symptoms worsen or any new symptoms arise, they should return to the ED immediately for further evaluation. All pt's questions were answered. Patient verbalizes understanding. PA SAMMY: MILES neg. Pt's wound dressing in left foot changed, bacitracin applied w/ new dressing. Pt is medically stable for discharge and follow up with PMD, Hand, Podiatry. Instructions given to return to ED for additional rabies vaccine. The patient was given verbal and written discharge instructions. Specifically, instructions when to return to the ED and when to seek follow-up from their pcp was discussed. Any specialty follow-up was discussed, including how to make an appointment.  Instructions were discussed in simple, plain language and was understood by the patient. The patient understands that should their symptoms worsen or any new symptoms arise, they should return to the ED immediately for further evaluation. All pt's questions were answered. Patient verbalizes understanding.

## 2019-06-21 NOTE — ED PROVIDER NOTE - SKIN LOCATION #1
toe.../right medial thigh with abrasion and superficial laceration w/ crusting, no erythema, no drainage, no signs of infection.

## 2019-06-21 NOTE — ED PROVIDER NOTE - CLINICAL SUMMARY MEDICAL DECISION MAKING FREE TEXT BOX
27 yo M, s/p dog bite injury w/ tendon repair of left hand 6/1919, left foot laceration repair 6/18/19, presents to ER for second rabies shot. Well appearing male presents for 2nd rabies shot, pt still unable to find out status of the dog's vaccination, c/o gas smell in urine. Plan: rabies vaccine, Ua, Ucx.

## 2019-06-21 NOTE — ED PROVIDER NOTE - NSFOLLOWUPINSTRUCTIONS_ED_ALL_ED_FT
Rest, drink plenty of fluids.  Advance activity as tolerated.  Continue all previously prescribed medications as directed.    You must complete a schedule to receive more Rabies vaccine by returning to the Emergency Department or your primary care physician on 6/25/19 (Day 7), Thursday 7/2/2019 (Day 14), and Thursday 7/16/2019 (Day 28) to receive 1 mL of Rabies Vaccine on each of these days.  Follow up with your primary care physician in 48-72 hours- bring copies of your results.  Return to the ER for worsening or persistent symptoms.     Call [phone] Callback PA to find out of your urine results. 8:30Am- 3PM Rest, drink plenty of fluids.  Advance activity as tolerated.  Continue all previously prescribed medications as directed.    You must complete a schedule to receive more Rabies vaccine by returning to the Emergency Department or your primary care physician on 6/25/19 (Day 7), Thursday 7/2/2019 (Day 14), and Thursday 7/16/2019 (Day 28) to receive 1 mL of Rabies Vaccine on each of these days.  Follow up with your primary care physician in 48-72 hours- bring copies of your results.  Return to the ER for worsening or persistent symptoms.     Podiatry. Dr. Hanson within 1 week; call [phone] for appointment. If unable to obtain appointment 2/2 insurance, pt can f/u at [facility] within 1 week. Call [phone] for appointment.

## 2019-06-21 NOTE — ED ADULT TRIAGE NOTE - CHIEF COMPLAINT QUOTE
pt here for second round of rabies vaccine. pt was here on tuesday when he was bit by a dog. had to have surgery to repair tendon. pt saw hand surgeon this AM for follow up

## 2019-06-21 NOTE — ED PROVIDER NOTE - OBJECTIVE STATEMENT
29 yo M, s/p dog bite injury w/ tendon repair of left hand 6/1919, left foot laceration repair 6/18/19, presents to ER for second rabies shot. Pt states he noted gas smell in his urine while in the hospital after getting some medication, no burning, no difficulty urinating, no morton catheter. Denies any fever, chills, n/v, dysuria, hematuria, weakness, numbness or any other complaints.

## 2019-06-21 NOTE — ED PROVIDER NOTE - SKIN AREA #1
laceration with sutures intact, dry crusting, no drainage, tender, no erythema, no signs of infection,/plantar

## 2019-06-23 LAB
BACTERIA UR CULT: SIGNIFICANT CHANGE UP
SPECIMEN SOURCE: SIGNIFICANT CHANGE UP

## 2019-06-25 ENCOUNTER — EMERGENCY (EMERGENCY)
Facility: HOSPITAL | Age: 29
LOS: 1 days | Discharge: HOME | End: 2019-06-25
Admitting: EMERGENCY MEDICINE
Payer: MEDICAID

## 2019-06-25 VITALS
SYSTOLIC BLOOD PRESSURE: 124 MMHG | RESPIRATION RATE: 16 BRPM | OXYGEN SATURATION: 100 % | TEMPERATURE: 99 F | HEART RATE: 83 BPM | DIASTOLIC BLOOD PRESSURE: 76 MMHG

## 2019-06-25 PROBLEM — Z00.00 ENCOUNTER FOR PREVENTIVE HEALTH EXAMINATION: Status: ACTIVE | Noted: 2019-06-25

## 2019-06-25 PROCEDURE — 99281 EMR DPT VST MAYX REQ PHY/QHP: CPT

## 2019-06-25 RX ORDER — RABIES VACC, HUMAN DIPLOID/PF 2.5 UNIT
1 VIAL (EA) INTRAMUSCULAR ONCE
Refills: 0 | Status: DISCONTINUED | OUTPATIENT
Start: 2019-06-25 | End: 2019-06-25

## 2019-06-25 RX ORDER — RABIES VACC, HUMAN DIPLOID/PF 2.5 UNIT
1 VIAL (EA) INTRAMUSCULAR ONCE
Refills: 0 | Status: COMPLETED | OUTPATIENT
Start: 2019-06-25 | End: 2019-06-25

## 2019-06-25 RX ADMIN — Medication 1 MILLILITER(S): at 10:47

## 2019-06-25 NOTE — ED PROVIDER NOTE - NSFOLLOWUPINSTRUCTIONS_ED_ALL_ED_FT
Follow up with  tomorrow, you have an appointment scheduled  Follow up with Podiatry on Friday 6/27, you have an appointment scheduled  Return to the ER on 7/02 for the 4th dose of your rabies vaccine  Any worsening pain or swelling, fever/chills, pus drainage from sites please return to the ER Follow up with  tomorrow, you have an appointment scheduled  Follow up with Podiatry on Friday 6/27, you have an appointment scheduled  Return to the ER on 7/02 for the 4th dose of your rabies vaccine  Continue taking Augmentin as prescribed to you   Any worsening pain or swelling, fever/chills, pus drainage from sites please return to the ER

## 2019-06-25 NOTE — ED PROVIDER NOTE - CLINICAL SUMMARY MEDICAL DECISION MAKING FREE TEXT BOX
28yM s/p tendon repair to left hand and left foot laceration repair on 6/19 presenting for 3rd dose of rabies vaccine.  Pt has appropriate f/u with hand and podiatry  On Augmentin, wounds are well appearing

## 2019-06-25 NOTE — ED PROVIDER NOTE - PHYSICAL EXAMINATION
left hand/arm with dressing in place  left foot sutures appear to be well healing, no sign of infection

## 2019-06-25 NOTE — ED PROVIDER NOTE - OBJECTIVE STATEMENT
28yM s/p tendon repair to left hand and left foot laceration repair from dog bite presenting for 3rd rabies vaccines. Pt received the first dose on 6/18, second dose on 6/21. Pt reports he is seeing  tomorrow for his hand and he is seeing the podiatrist on Friday 6/27 for his foot. Pt denies fever/chills, pain or swelling to foot or hand, numbness, abd pain, n/v/d, dysuria or any other concerns.

## 2019-06-25 NOTE — ED PROVIDER NOTE - PROGRESS NOTE DETAILS
STANLEY Beltre: Prior d/c note on 6/21 reports pt will need 5 doses of rabies vaccine, per ID not pt to require 4 doses (he is not immunocompromised).

## 2019-06-28 ENCOUNTER — APPOINTMENT (OUTPATIENT)
Dept: PODIATRY | Facility: HOSPITAL | Age: 29
End: 2019-06-28

## 2019-06-28 ENCOUNTER — OUTPATIENT (OUTPATIENT)
Dept: OUTPATIENT SERVICES | Facility: HOSPITAL | Age: 29
LOS: 1 days | End: 2019-06-28
Payer: SELF-PAY

## 2019-06-28 VITALS
DIASTOLIC BLOOD PRESSURE: 88 MMHG | BODY MASS INDEX: 23.7 KG/M2 | HEIGHT: 69 IN | SYSTOLIC BLOOD PRESSURE: 147 MMHG | WEIGHT: 160 LBS | HEART RATE: 78 BPM | RESPIRATION RATE: 14 BRPM

## 2019-06-28 DIAGNOSIS — S91.319A LACERATION W/OUT FOREIGN BODY, UNSPECIFIED FOOT, INITIAL ENCOUNTER: ICD-10-CM

## 2019-06-28 DIAGNOSIS — M79.609 PAIN IN UNSPECIFIED LIMB: ICD-10-CM

## 2019-06-28 DIAGNOSIS — S91.319A LACERATION WITHOUT FOREIGN BODY, UNSPECIFIED FOOT, INITIAL ENCOUNTER: ICD-10-CM

## 2019-06-28 PROCEDURE — G0463: CPT

## 2019-06-28 NOTE — ASSESSMENT
[FreeTextEntry1] : 27 y/o male pt with left foot laceration\par - pt seen and evaluated\par - laceration well coapted, no dehiscence, no signs of infection\par - small area of maceration in 2nd interspace betadined\par - Dry sterile dressing applied \par - Follow up in 1 week for suture removal

## 2019-06-28 NOTE — HISTORY OF PRESENT ILLNESS
[FreeTextEntry1] : 27 y/o male pt with left foot laceration \par pt states that he was attacked by a dog 10 days ago and at that time he went to [facility] where he was seen and the foot laceration was repaired. He states that he has been taking antibiotics since then, denies any f/n/v/c/sob.

## 2019-06-28 NOTE — PHYSICAL EXAM
[Full Pulse] : the pedal pulses are present [Edema] : there was no peripheral edema [Abnormal Walk] : normal gait [FreeTextEntry1] : laceration site well coapted. no dehiscence, no signs of infection, no erythema, no drainage

## 2019-07-02 ENCOUNTER — EMERGENCY (EMERGENCY)
Facility: HOSPITAL | Age: 29
LOS: 1 days | Discharge: HOME | End: 2019-07-02
Attending: EMERGENCY MEDICINE | Admitting: EMERGENCY MEDICINE
Payer: MEDICAID

## 2019-07-02 VITALS
SYSTOLIC BLOOD PRESSURE: 126 MMHG | TEMPERATURE: 99 F | DIASTOLIC BLOOD PRESSURE: 81 MMHG | RESPIRATION RATE: 14 BRPM | OXYGEN SATURATION: 100 % | HEART RATE: 77 BPM

## 2019-07-02 PROCEDURE — 99281 EMR DPT VST MAYX REQ PHY/QHP: CPT

## 2019-07-02 PROCEDURE — 99053 MED SERV 10PM-8AM 24 HR FAC: CPT

## 2019-07-02 RX ORDER — RABIES VACC, HUMAN DIPLOID/PF 2.5 UNIT
1 VIAL (EA) INTRAMUSCULAR ONCE
Refills: 0 | Status: COMPLETED | OUTPATIENT
Start: 2019-07-02 | End: 2019-07-02

## 2019-07-02 RX ADMIN — Medication 1 MILLILITER(S): at 08:33

## 2019-07-02 NOTE — ED PROVIDER NOTE - CARE PLAN
Principal Discharge DX:	Dog bite Principal Discharge DX:	Rabies exposure  Secondary Diagnosis:	Dog bite, subsequent encounter

## 2019-07-02 NOTE — ED PROVIDER NOTE - ATTENDING CONTRIBUTION TO CARE
Pt was seen and evaluated by me. Pt is a 27 y/o male with no significant PMHx who presented to the ED for 4th rabies vaccine and wound check. Pt was bit by a dog on 6/18 to left hand and foot. Pt has since been followed by Dr. Godinez for hand injury s/p tendon repair. Pt was to follow up with Podiatry but his clinic would not take his insurance. Pt states sutures are due for removal. Pt is also here for 4th rabies shot (1st dose on 6/18, 2nd dose on 6/21, and 3rd dose on 6/25). Pt denies any fever, chills, nausea, vomiting, SOB, chest pain, or abd pain. Left hand in splint with improvement in swelling and movement as per pt. Left foot with sutures in place to sole of foot, clean, dry, and intact. No surrounding erythema or discharge. + distal pulses.

## 2019-07-02 NOTE — CONSULT NOTE ADULT - ASSESSMENT
29 y/o M here for suture removal s/p lac repair to L foot plantar 3rd digit  -Pt seen and evaluated in ED  -2 weeks s/p lac repair, pt [facility] up in [facility] but has since had insurance issues and came to ED for suture removal as he could not make his appt at clinic  -Sutures intact w/ no dehiscence, skin is healed w/ no signs of infection  -Sutures removed w/ sterile #11 blade and pickups  -Applied steri strips over lac site and covered w/ DSD  -Instructed pt to keep dressing clean dry and intact for 3 days  -Can d/c surgical shoe unless area is sensitive  -Follow up w/ Dr. Hanson at [facility] in 2 weeks. Call [phone] to make an appt.  -Discussed w/ attending

## 2019-07-02 NOTE — ED PROVIDER NOTE - PHYSICAL EXAMINATION
· Physical Examination: left hand/arm with dressing in place  left foot sutures appear to be well healing, no sign of infection  · CONSTITUTIONAL: Well appearing, well nourished, awake, alert, oriented to person, place, time/situation and in no apparent distress.  · CARDIAC: Normal rate, regular rhythm.  Heart sounds S1, S2.  · RESPIRATORY: Breath sounds clear and equal bilaterally.  · GASTROINTESTINAL: Abdomen soft, non-tender, no guarding.  · NEUROLOGICAL: Alert and oriented, no focal deficits, no motor or sensory deficits.

## 2019-07-02 NOTE — ED PROVIDER NOTE - PROGRESS NOTE DETAILS
Dr. Beckett: Pt received 4th shot. Pt was also seen by Podiatry who removed sutures. Area clean and intact. Advised to keep area clean and change dressing daily as well as f/u with Dr. Godinez as scheduled.

## 2019-07-02 NOTE — ED ADULT NURSE NOTE - OBJECTIVE STATEMENT
pt in intake rm 1. pt came into ED for 4th follow up rabies shot. pt noted to arrive with brace immobilizer to left forearm. pt able to move fingers in left hand. stitches noted to bottom of left foot, stitches dry and intact, no drainage, redness or swelling noted. pt able to wiggle toes in left foot.

## 2019-07-02 NOTE — ED PROVIDER NOTE - CLINICAL SUMMARY MEDICAL DECISION MAKING FREE TEXT BOX
Pt presenting for 4th rabies ppx vaccine s/p dog bite. Pt also with well healing wound in L foot s/p podiatry repair, needs wound cleared

## 2019-07-02 NOTE — CONSULT NOTE ADULT - SUBJECTIVE AND OBJECTIVE BOX
Podiatry pager #: [phone] ([place])/ [phone] ([facility])    Patient is a 28y old  Male who presents with a chief complaint of suture removal.    HPI: 28yM s/p tendon repair to left hand and left foot laceration repair from dog bite presenting for 4th rabies vaccines. Pt received the first dose on 6/18, second dose on 6/21, third dose on 6/25. Pt also requesting to have sutures removed from his L foot; states he lost his insurance and so was unable to be seen in the podiatry clinic for suture removal. Foot wound appears clean and is healing well, pt bearing weight well in protective surgical shoe.      PAST MEDICAL & SURGICAL HISTORY:  No pertinent past medical history  No significant past surgical history      MEDICATIONS  (STANDING):    MEDICATIONS  (PRN):      Allergies    allergy tested positive to seema tree. pt does not know reaction. (Other)  fish (Other)  No Known Drug Allergies    Intolerances        VITALS:    Vital Signs Last 24 Hrs  T(C): 37 (02 Jul 2019 07:29), Max: 37 (02 Jul 2019 07:29)  T(F): 98.6 (02 Jul 2019 07:29), Max: 98.6 (02 Jul 2019 07:29)  HR: 77 (02 Jul 2019 07:29) (77 - 77)  BP: 126/81 (02 Jul 2019 07:29) (126/81 - 126/81)  BP(mean): --  RR: 14 (02 Jul 2019 07:29) (14 - 14)  SpO2: 100% (02 Jul 2019 07:29) (100% - 100%)    LABS:                CAPILLARY BLOOD GLUCOSE              LOWER EXTREMITY PHYSICAL EXAM:  Vascular: DP/PT 2/4, B/L, CFT <3 seconds B/L, Temperature gradient warm to warm, B/L.   Neuro: Epicritic sensation intact to the level of digits, B/L.  Musculoskeletal/Ortho: No pain on palpation at any pedal site. No pain with flexion or extension of the 2nd digit or 3rd digit.  Skin: s/p left foot plantar 3rd digit lac repair, sutures intact w/ no dehiscence, skin is healed w/ no signs of infection.

## 2019-07-02 NOTE — ED PROVIDER NOTE - OBJECTIVE STATEMENT
28yM s/p tendon repair to left hand and left foot laceration repair from dog bite presenting for 4th rabies vaccines. Pt received the first dose on 6/18, second dose on 6/21, third dose on 6/25. Pt also requesting to have sutures removed from his L foot; states he lost his insurance and so was unable to be seen in the podiatry clinic for suture removal. Foot wound appears clean and is healing well, pt bearing weight well in protective surgical shoe.

## 2019-07-02 NOTE — ED PROVIDER NOTE - ENMT NEGATIVE STATEMENT, MLM
9 Ears: no ear pain and no hearing problems.Nose: no nasal congestion and no nasal drainage.Mouth/Throat: no dysphagia, no hoarseness and no throat pain.Neck: no lumps, no pain, no stiffness and no swollen glands.

## 2019-07-02 NOTE — ED PROVIDER NOTE - NSFOLLOWUPINSTRUCTIONS_ED_ALL_ED_FT
Rest and plenty of fluids.  Follow up with Dr. Xavier as scheduled.  Keep area clean and dry.   Change dressing daily.

## 2021-05-31 NOTE — DISCHARGE NOTE NURSING/CASE MANAGEMENT/SOCIAL WORK - NSPROEXTENSIONSOFSELF_GEN_A_NUR
pt unable to take any steps, knees buckling in standing; pt would benefit from use of danie steady lift vs luis lift for OOB transfers at this time for safe patient handling none

## 2022-10-04 NOTE — ED PROVIDER NOTE - NSCAREINITIATED _GEN_ER
Julius Ugalde(Resident) Hydroxychloroquine Counseling:  I discussed with the patient that a baseline ophthalmologic exam is needed at the start of therapy and every year thereafter while on therapy. A CBC may also be warranted for monitoring.  The side effects of this medication were discussed with the patient, including but not limited to agranulocytosis, aplastic anemia, seizures, rashes, retinopathy, and liver toxicity. Patient instructed to call the office should any adverse effect occur.  The patient verbalized understanding of the proper use and possible adverse effects of Plaquenil.  All the patient's questions and concerns were addressed.

## 2023-07-06 NOTE — BRIEF OPERATIVE NOTE - NSICDXBRIEFPROCEDURE_GEN_ALL_CORE_FT
PROCEDURES:  Repair, flexor tendon 19-Jun-2019 22:38:51  Anup Hanna  Exploration of left hand 19-Jun-2019 22:38:36  Anup Hanna Pre-Excision Curettage Text (Leave Blank If You Do Not Want): Prior to drawing the surgical margin the visible lesion was removed with electrodesiccation and curettage to clearly define the lesion size.